# Patient Record
Sex: MALE | Race: WHITE | NOT HISPANIC OR LATINO | Employment: FULL TIME | URBAN - NONMETROPOLITAN AREA
[De-identification: names, ages, dates, MRNs, and addresses within clinical notes are randomized per-mention and may not be internally consistent; named-entity substitution may affect disease eponyms.]

---

## 2023-04-04 ENCOUNTER — OFFICE VISIT (OUTPATIENT)
Dept: PRIMARY CARE | Facility: CLINIC | Age: 33
End: 2023-04-04
Payer: COMMERCIAL

## 2023-04-04 VITALS
RESPIRATION RATE: 14 BRPM | HEART RATE: 98 BPM | SYSTOLIC BLOOD PRESSURE: 121 MMHG | OXYGEN SATURATION: 94 % | WEIGHT: 283.2 LBS | BODY MASS INDEX: 39.5 KG/M2 | DIASTOLIC BLOOD PRESSURE: 74 MMHG | TEMPERATURE: 97.7 F

## 2023-04-04 DIAGNOSIS — J02.9 PHARYNGITIS, UNSPECIFIED ETIOLOGY: Primary | ICD-10-CM

## 2023-04-04 PROBLEM — F41.9 ANXIETY: Status: ACTIVE | Noted: 2023-04-04

## 2023-04-04 PROBLEM — E66.9 OBESITY: Status: ACTIVE | Noted: 2023-04-04

## 2023-04-04 PROBLEM — F32.5 DEPRESSION, MAJOR, IN REMISSION (CMS-HCC): Status: ACTIVE | Noted: 2023-04-04

## 2023-04-04 PROBLEM — G47.33 OSA ON CPAP: Status: ACTIVE | Noted: 2023-04-04

## 2023-04-04 LAB — POC RAPID STREP: POSITIVE

## 2023-04-04 PROCEDURE — 87880 STREP A ASSAY W/OPTIC: CPT | Performed by: FAMILY MEDICINE

## 2023-04-04 PROCEDURE — 99214 OFFICE O/P EST MOD 30 MIN: CPT | Performed by: FAMILY MEDICINE

## 2023-04-04 PROCEDURE — 1036F TOBACCO NON-USER: CPT | Performed by: FAMILY MEDICINE

## 2023-04-04 RX ORDER — SERTRALINE HYDROCHLORIDE 50 MG/1
75 TABLET, FILM COATED ORAL DAILY
COMMUNITY
Start: 2019-04-01 | End: 2023-12-18 | Stop reason: SDUPTHER

## 2023-04-04 RX ORDER — AZITHROMYCIN 250 MG/1
TABLET, FILM COATED ORAL
Qty: 6 TABLET | Refills: 0 | Status: SHIPPED | OUTPATIENT
Start: 2023-04-04 | End: 2023-05-11 | Stop reason: ALTCHOICE

## 2023-04-04 ASSESSMENT — ENCOUNTER SYMPTOMS
TROUBLE SWALLOWING: 0
SHORTNESS OF BREATH: 0
DIARRHEA: 0
STRIDOR: 0
HEADACHES: 0
VOMITING: 0
COUGH: 0
HOARSE VOICE: 0
ABDOMINAL PAIN: 0
SORE THROAT: 1
SWOLLEN GLANDS: 0
NECK PAIN: 0

## 2023-04-04 NOTE — PATIENT INSTRUCTIONS
BMI was above normal measurement. Current weight:    Weight change since last visit (-) denotes wt loss     Weight loss needed to achieve BMI 25:   Lbs  Weight loss needed to achieve BMI 30:   Lbs  Provided instructions on dietary changes  Advised to Increase physical activityBMI was above normal measurement. Current weight:    Weight change since last visit (-) denotes wt loss     Weight loss needed to achieve BMI 25:   Lbs  Weight loss needed to achieve BMI 30:   Lbs  Provided instructions on dietary changes  Advised to Increase physical activity..

## 2023-04-04 NOTE — PROGRESS NOTES
HPI:    Shayan Estes. Is 33 y.o.. male. Here for acute visit-     PCP is - [facility]      What concern/ problem/pain/symptom  brings you here today?    S/t     how long has pt had sxs?    4 days     describe symptoms-    S/t- feels better today, but wife tested positive for strep    fever- no  nausea- no  vomiting- no    how often do symptoms occur?    Constant     has pt tried anything for current symptoms, including medications (OTC or prescription)  ?      no    what makes symptoms worse?    N/a     has pt been seen recently for this problem ( within past 2-3 weeks) ?   No    if yes- where?  N/a     by who?  N/a    what treatment was provided?    N/a       Pt was treated for positive Strep in Feb- amoxicillin       Wife positive for strep today       Rapid strep- done today- positive      /74 (BP Location: Left arm, Patient Position: Sitting, BP Cuff Size: Large adult)   Pulse 98   Temp 36.5 °C (97.7 °F) (Temporal)   Resp 14   Wt 128 kg (283 lb 3.2 oz)   BMI 39.50 kg/m²         PHYSICAL:      Pt is A and O x3, NAD, Vital signs are within normal limits  General Appearance- normal , good hygiene, talks easily  EYES- conjunctiva- normal  lids- normal  EARS/NOSE-TM's normal, head normocephalic and atraumatic, nasopharynx-no nasal discharge, no trismus, no hot potato voice  OROPHARYNX- red with mild exudate  NECK- supple, FROM  LYMPH- mild bilateral scattered cervical lymph nodes palpated - tender  CV- RRR without murmur  EXTREMITIES- no edema or varicosities  PULM- CTA bilaterally  Respiratory effort- normal respiratory effort , no retractions or nasal flaring   ABDOMEN- normoactive BS's  SKIN- no abnormal skin lesions on inspection or palpation   NEURO- no focal deficits  PSYCH- pleasant, normal judgement and insight      The number and complexity of problems addressed is considered moderate.  The amount and/or complexity of data reviewed and analyzed is considered moderate. The risk of complications  and/or morbidity/mortality of patient is considered moderate. Overall, this patient encounter is considered a moderate risk visit.      What are antibiotics?  Antibiotics are medicines that help people fight infections caused by bacteria. They work by killing bacteria that are in the body. These medicines come in many different forms, including pills, ointments, and liquids that are given by injection.    Antibiotics can do a lot of good. For people with serious bacterial infections, antibiotics can save lives. But people use them far too often, even when they're not needed. This is causing a very serious problem called antibiotic resistance. Antibiotic resistance happens when bacteria that have been exposed to an antibiotic change so that the antibiotic can no longer kill them. In those bacteria, the antibiotic has no effect. Because of this problem, doctors are having a harder and harder time treating infections. Experts worry that there will soon be infections that don't respond to any antibiotics.    When are antibiotics helpful?  Antibiotics can help people fight off infections caused by bacteria. They do not work on infections caused by viruses.    Some common bacterial infections that are treated with antibiotics include:    Strep throat    Pneumonia (an infection of the lungs)    Bladder infections    Infections you catch through sex, such as gonorrhea and chlamydia    When are antibiotics NOT helpful?    Antibiotics do not work on infections caused by viruses.    Antibiotics are not helpful for the common cold, because the common cold is caused by a virus.    Antibiotics are not helpful for the flu, because the flu is caused by a virus. (People with the flu can be treated with medicines called antiviral medicines, which are different from antibiotics.)      Even though antibiotics don't work on infections caused by viruses, people sometimes believe that they do. That's because they took antibiotics for a  viral infection before and then got better. The problem is that those people would have gotten better with or without an antibiotic. When they get better with the antibiotic, they think that's what cured them, when in reality the antibiotic had nothing to do with it.    What's the harm in taking antibiotics even if they might not help?  There are many reasons you should not take antibiotics unless you absolutely need them:    Antibiotics cause side effects, such as nausea, vomiting, and diarrhea. They can even make it more likely that you will get a different kind of infection, such as yeast infection (if you are a woman).    Allergies to antibiotics are common. You can develop an allergy to an antibiotic, even if you have not had a problem with it before. Some allergies are just unpleasant, causing rashes and itching. But some can be very serious and even life-threatening. It is better to avoid any risk of an allergy, if the antibiotic wouldn't help you anyway.    Overuse of antibiotics leads to antibiotic resistance. Using antibiotics when they are not needed gives bacteria a chance to change, so that the antibiotics cannot hurt them later on. People who have infections caused by antibiotic-resistant bacteria often have to be treated in the hospital with many different antibiotics. People can even die from these infections, because there is no antibiotic that will cure them.    When should I take antibiotics?  You should take antibiotics only when a doctor or nurse prescribes them to you. You should never take antibiotics prescribed to someone else, and you should not take antibiotics that were prescribed to you for a previous illness. When prescribing an antibiotic, doctors and nurses have to carefully pick the right antibiotic for a particular infection. Not all antibiotics work on all bacteria.    If you do have an infection caused by a bacteria, your doctor or nurse might want to find out what that bacteria  "is, and which antibiotics can kill it. They do this by taking a \"culture\" of the bacteria and growing it in the lab. But it's not possible to do a culture on someone who has already started an antibiotic. So if you start an antibiotic without input from a doctor or nurse it will be impossible to know if you have taken the right one.    What can I do to reduce antibiotic resistance?  Here are some things that can help:    Do not pressure your doctor or nurse for antibiotics when they do not think you need them.    If you are prescribed antibiotics, finish all of the medicine and take it exactly as directed. Never skip doses or stop taking the medicine without talking to your doctor or nurse.    Do not give antibiotics that were prescribed to you to anyone else.    What if my doctor prescribes an antibiotic that did not work for me before?  If an antibiotic did not work for you before, that does not mean it will never work for you. If you have used an antibiotic before and it did not work, tell your doctor. But keep in mind that the infection you had before might not have been caused by the same bacteria that you have now. The \"best\" antibiotic is the right one for the bacteria causing the infection, not for the person with the infection.    What if I am allergic to an antibiotic?  If you had a bad reaction to an antibiotic, tell your doctor or nurse about it. But do not assume you are allergic unless your doctor or nurse tells you that you are.    Many people who think they are allergic to an antibiotic are wrong. If you get nauseous after taking an antibiotic, that does not mean you are allergic to it. Nausea is a common side effect of many antibiotics. If you are a woman and you get a yeast infection after taking an antibiotic, that does not mean you are allergic to it. Yeast infections are a common side effect of antibiotics.    Symptoms of antibiotic allergy can be mild and include a flat rash and itching. They " can also be more serious and include:    Hives - Hives are raised, red patches of skin that are usually very itchy (picture 1).    Lip or tongue swelling    Trouble swallowing or breathing    Serious allergy symptoms can start right after you start taking an antibiotic if you are very sensitive. Less serious symptoms, on the other hand, often start a day or more later.    Almost all antibiotics may cause possible side effects of allergic reaction, nausea, vomiting, diarrhea- most worrisome caused by C. diff, and secondary yeast infection.        ASSESSMENT/PLAN:      Start zithromax         Call if no better or if symptoms worsen

## 2023-04-04 NOTE — PROGRESS NOTES
HPI:    Shayan Estes. Is 33 y.o.. male. Here for acute visit-     PCP is - [facility]      What concern/ problem/pain/symptom  brings you here today?      how long has pt had sxs?      describe symptoms-    fever-  nausea-  vomiting-    how often do symptoms occur?      has pt tried anything for current symptoms, including medications (OTC or prescription)  ?        what makes symptoms worse?      has pt been seen recently for this problem ( within past 2-3 weeks) ?     if yes- where?    by who?    what treatment was provided?    There were no vitals taken for this visit.        PHYSICAL:      Pt is A and O x3, NAD, Vital signs are within normal limits  General Appearance- normal , good hygiene, talks easily  EYES- conjunctiva- normal  lids- normal  EARS/NOSE-TM's normal, head normocephalic and atraumatic, nasopharynx-no nasal discharge, no trismus, no hot potato voice  OROPHARYNX- red with mild exudate  NECK- supple, FROM  LYMPH- mild bilateral scattered cervical lymph nodes palpated - tender  CV- RRR without murmur  EXTREMITIES- no edema or varicosities  PULM- CTA bilaterally  Respiratory effort- normal respiratory effort , no retractions or nasal flaring   ABDOMEN- normoactive BS's  SKIN- no abnormal skin lesions on inspection or palpation   NEURO- no focal deficits  PSYCH- pleasant, normal judgement and insight      The number and complexity of problems addressed is considered moderate.  The amount and/or complexity of data reviewed and analyzed is considered moderate. The risk of complications and/or morbidity/mortality of patient is considered moderate. Overall, this patient encounter is considered a moderate risk visit.      What are antibiotics?  Antibiotics are medicines that help people fight infections caused by bacteria. They work by killing bacteria that are in the body. These medicines come in many different forms, including pills, ointments, and liquids that are given by injection.    Antibiotics can do  a lot of good. For people with serious bacterial infections, antibiotics can save lives. But people use them far too often, even when they're not needed. This is causing a very serious problem called antibiotic resistance. Antibiotic resistance happens when bacteria that have been exposed to an antibiotic change so that the antibiotic can no longer kill them. In those bacteria, the antibiotic has no effect. Because of this problem, doctors are having a harder and harder time treating infections. Experts worry that there will soon be infections that don't respond to any antibiotics.    When are antibiotics helpful?  Antibiotics can help people fight off infections caused by bacteria. They do not work on infections caused by viruses.    Some common bacterial infections that are treated with antibiotics include:    Strep throat    Pneumonia (an infection of the lungs)    Bladder infections    Infections you catch through sex, such as gonorrhea and chlamydia    When are antibiotics NOT helpful?    Antibiotics do not work on infections caused by viruses.    Antibiotics are not helpful for the common cold, because the common cold is caused by a virus.    Antibiotics are not helpful for the flu, because the flu is caused by a virus. (People with the flu can be treated with medicines called antiviral medicines, which are different from antibiotics.)      Even though antibiotics don't work on infections caused by viruses, people sometimes believe that they do. That's because they took antibiotics for a viral infection before and then got better. The problem is that those people would have gotten better with or without an antibiotic. When they get better with the antibiotic, they think that's what cured them, when in reality the antibiotic had nothing to do with it.    What's the harm in taking antibiotics even if they might not help?  There are many reasons you should not take antibiotics unless you absolutely need  "them:    Antibiotics cause side effects, such as nausea, vomiting, and diarrhea. They can even make it more likely that you will get a different kind of infection, such as yeast infection (if you are a woman).    Allergies to antibiotics are common. You can develop an allergy to an antibiotic, even if you have not had a problem with it before. Some allergies are just unpleasant, causing rashes and itching. But some can be very serious and even life-threatening. It is better to avoid any risk of an allergy, if the antibiotic wouldn't help you anyway.    Overuse of antibiotics leads to antibiotic resistance. Using antibiotics when they are not needed gives bacteria a chance to change, so that the antibiotics cannot hurt them later on. People who have infections caused by antibiotic-resistant bacteria often have to be treated in the hospital with many different antibiotics. People can even die from these infections, because there is no antibiotic that will cure them.    When should I take antibiotics?  You should take antibiotics only when a doctor or nurse prescribes them to you. You should never take antibiotics prescribed to someone else, and you should not take antibiotics that were prescribed to you for a previous illness. When prescribing an antibiotic, doctors and nurses have to carefully pick the right antibiotic for a particular infection. Not all antibiotics work on all bacteria.    If you do have an infection caused by a bacteria, your doctor or nurse might want to find out what that bacteria is, and which antibiotics can kill it. They do this by taking a \"culture\" of the bacteria and growing it in the lab. But it's not possible to do a culture on someone who has already started an antibiotic. So if you start an antibiotic without input from a doctor or nurse it will be impossible to know if you have taken the right one.    What can I do to reduce antibiotic resistance?  Here are some things that can " "help:    Do not pressure your doctor or nurse for antibiotics when they do not think you need them.    If you are prescribed antibiotics, finish all of the medicine and take it exactly as directed. Never skip doses or stop taking the medicine without talking to your doctor or nurse.    Do not give antibiotics that were prescribed to you to anyone else.    What if my doctor prescribes an antibiotic that did not work for me before?  If an antibiotic did not work for you before, that does not mean it will never work for you. If you have used an antibiotic before and it did not work, tell your doctor. But keep in mind that the infection you had before might not have been caused by the same bacteria that you have now. The \"best\" antibiotic is the right one for the bacteria causing the infection, not for the person with the infection.    What if I am allergic to an antibiotic?  If you had a bad reaction to an antibiotic, tell your doctor or nurse about it. But do not assume you are allergic unless your doctor or nurse tells you that you are.    Many people who think they are allergic to an antibiotic are wrong. If you get nauseous after taking an antibiotic, that does not mean you are allergic to it. Nausea is a common side effect of many antibiotics. If you are a woman and you get a yeast infection after taking an antibiotic, that does not mean you are allergic to it. Yeast infections are a common side effect of antibiotics.    Symptoms of antibiotic allergy can be mild and include a flat rash and itching. They can also be more serious and include:    Hives - Hives are raised, red patches of skin that are usually very itchy (picture 1).    Lip or tongue swelling    Trouble swallowing or breathing    Serious allergy symptoms can start right after you start taking an antibiotic if you are very sensitive. Less serious symptoms, on the other hand, often start a day or more later.    Almost all antibiotics may cause possible " side effects of allergic reaction, nausea, vomiting, diarrhea- most worrisome caused by C. diff, and secondary yeast infection.        ASSESSMENT/PLAN:      Start         Call if no better or if symptoms worsen

## 2023-05-10 PROBLEM — E55.9 VITAMIN D DEFICIENCY: Status: ACTIVE | Noted: 2023-05-10

## 2023-05-10 PROBLEM — R74.01 ELEVATED ALT MEASUREMENT: Status: ACTIVE | Noted: 2023-05-10

## 2023-05-10 NOTE — PROGRESS NOTES
Subjective   Patient ID: Shayan Estes is a 33 y.o. male who presents for Rash (Red spots on knees and legs few months /Mostly on knees /Itch once in a while ).    HPI     White dry spots on knees   Some red spots as well  Mostly just on legs  Started last year but has worsened in past few months  Itch at times   He has tried hydrocortisone cream   Denies skin issues - no known psoriasis or eczema   No new changes at home   No med changes     Review of Systems   Constitutional:  Negative for chills, fatigue and fever.   HENT:  Negative for congestion, ear pain and sore throat.    Eyes:  Negative for visual disturbance.   Respiratory:  Negative for cough and shortness of breath.    Cardiovascular:  Negative for chest pain, palpitations and leg swelling.   Gastrointestinal:  Negative for abdominal pain, constipation, diarrhea, nausea and vomiting.   Genitourinary: Negative.    Musculoskeletal: Negative.    Skin:  Positive for rash.   Neurological:  Negative for dizziness, weakness, numbness and headaches.   Psychiatric/Behavioral: Negative.         Objective   /79 (BP Location: Right arm, Patient Position: Sitting, BP Cuff Size: Large adult)   Pulse 89   Temp 36.8 °C (98.3 °F) (Temporal)   Resp 16   Wt 126 kg (278 lb 11.2 oz)   SpO2 96%   BMI 38.87 kg/m²     Physical Exam    Constitutional: Well developed, well nourished, alert and in no acute distress   Eyes: Normal external exam.   Skin: Warm, well perfused, normal skin turgor and color. There are 2 large red plaques with superficial white scale noted on his bilateral knees with several smaller scattered erythematous macules on upper thighs and lower legs.    Neurologic: Cranial nerves II-XII grossly intact.   Psychiatric: Mood calm and affect normal.      Assessment/Plan   Problem List Items Addressed This Visit          Immune    Psoriasis - Primary     Concern for psoriasis- refer dermatology for confirmation   Start topical steroids twice daily  for 2 weeks  Advised him not to use on face, underarms, or groin          Relevant Medications    clobetasol (Temovate) 0.05 % cream       Molly Barfield,   5/11/2023

## 2023-05-11 ENCOUNTER — OFFICE VISIT (OUTPATIENT)
Dept: PRIMARY CARE | Facility: CLINIC | Age: 33
End: 2023-05-11
Payer: COMMERCIAL

## 2023-05-11 VITALS
BODY MASS INDEX: 38.87 KG/M2 | WEIGHT: 278.7 LBS | TEMPERATURE: 98.3 F | RESPIRATION RATE: 16 BRPM | HEART RATE: 89 BPM | OXYGEN SATURATION: 96 % | DIASTOLIC BLOOD PRESSURE: 79 MMHG | SYSTOLIC BLOOD PRESSURE: 123 MMHG

## 2023-05-11 DIAGNOSIS — L40.9 PSORIASIS: Primary | ICD-10-CM

## 2023-05-11 PROBLEM — E66.812 CLASS 2 OBESITY WITHOUT SERIOUS COMORBIDITY WITH BODY MASS INDEX (BMI) OF 38.0 TO 38.9 IN ADULT: Status: ACTIVE | Noted: 2023-04-04

## 2023-05-11 PROCEDURE — 1036F TOBACCO NON-USER: CPT | Performed by: FAMILY MEDICINE

## 2023-05-11 PROCEDURE — 99213 OFFICE O/P EST LOW 20 MIN: CPT | Performed by: FAMILY MEDICINE

## 2023-05-11 RX ORDER — CLOBETASOL PROPIONATE 0.5 MG/G
CREAM TOPICAL 2 TIMES DAILY
Qty: 60 G | Refills: 0 | Status: SHIPPED | OUTPATIENT
Start: 2023-05-11

## 2023-05-11 ASSESSMENT — PATIENT HEALTH QUESTIONNAIRE - PHQ9
1. LITTLE INTEREST OR PLEASURE IN DOING THINGS: NOT AT ALL
SUM OF ALL RESPONSES TO PHQ9 QUESTIONS 1 AND 2: 0
2. FEELING DOWN, DEPRESSED OR HOPELESS: NOT AT ALL

## 2023-05-11 ASSESSMENT — ENCOUNTER SYMPTOMS
HEADACHES: 0
MUSCULOSKELETAL NEGATIVE: 1
NUMBNESS: 0
NAUSEA: 0
VOMITING: 0
CONSTIPATION: 0
PSYCHIATRIC NEGATIVE: 1
CHILLS: 0
SHORTNESS OF BREATH: 0
COUGH: 0
WEAKNESS: 0
FEVER: 0
PALPITATIONS: 0
DIZZINESS: 0
FATIGUE: 0
ABDOMINAL PAIN: 0
DIARRHEA: 0
SORE THROAT: 0

## 2023-05-11 NOTE — ASSESSMENT & PLAN NOTE
Concern for psoriasis- refer dermatology for confirmation   Start topical steroids twice daily for 2 weeks  Advised him not to use on face, underarms, or groin

## 2023-10-05 ENCOUNTER — TELEPHONE (OUTPATIENT)
Dept: PRIMARY CARE | Facility: CLINIC | Age: 33
End: 2023-10-05
Payer: COMMERCIAL

## 2023-10-06 NOTE — PROGRESS NOTES
Subjective        Shayan Estes. Is 33 y.o.. male. Here for follow up office visit-       Chief Complaint   Patient presents with    Suture / Staple Removal     Left hand base of thumb    Immunizations     Influenza vaccine accepted.  Screening completed.     Dr Barfield pt     Dr Lico DO  is unavailable to see pt for follow up today so I am seeing the patient.    HPI:      How is patient doing today?  No pain, no redness or drainage.      Follow up for ED/UC/Hospital admission:  Date(s):  10/2/23      Dx:  Laceration left hand/thumb- 2 cm palmar aspect left thumb   Elevated BP- 175/109; repeat 131/70    ED course:  33-year-old male presents for wound evaluation/hand laceration, patient was cleaning his 3D printer when he cut himself with a metal scraper, he is not up-to-date on tetanus, he denies any other complaints       3 sutures were placed       Right hand dominant       Pt had Tdap at ED     Vision screen done today      Patient Active Problem List    Diagnosis Date Noted    Psoriasis 05/11/2023    Vitamin D deficiency 05/10/2023    Elevated ALT measurement 05/10/2023    Anxiety 04/04/2023    Depression, major, in remission (CMS/HCC) 04/04/2023    Class 2 obesity without serious comorbidity with body mass index (BMI) of 38.0 to 38.9 in adult 04/04/2023    TIFFANIE on CPAP 04/04/2023               Patient Care Team:  Molly Barfield DO as PCP - General  Bhakti Nieves MD as PCP - Lino OLIVER PCP    REVIEW OF SYSTEMS:        Objective      Vitals:    10/09/23 0705   BP: 108/70   BP Location: Left arm   Patient Position: Sitting   BP Cuff Size: Large adult   Pulse: 78   Temp: 36.5 °C (97.7 °F)   TempSrc: Temporal   SpO2: 93%   Weight: 131 kg (289 lb 9.6 oz)       PHYSICAL:      Patient is alert and oriented x 3 , NAD  SKIN- left thumb- laceration/wound healing nicely- no signs infection- sutures easily removed , steri- strips applied   NEURO- no focal deficits  PSYCH- pleasant, normal judgement  and insight      BP Readings from Last 3 Encounters:   10/09/23 108/70   05/11/23 123/79   04/25/23 166/89     BP today looks good- not dizzy or lightheaded     Wt Readings from Last 3 Encounters:   10/09/23 131 kg (289 lb 9.6 oz)   05/11/23 126 kg (278 lb 11.2 oz)   04/25/23 127 kg (280 lb)       BMI Readings from Last 3 Encounters:   10/09/23 40.39 kg/m²   05/11/23 38.87 kg/m²   04/25/23 39.05 kg/m²           The number and complexity of problems addressed is considered moderate.  The amount and/or complexity of data reviewed and analyzed is considered moderate. The risk of complications and/or morbidity/mortality of patient is considered moderate. Overall, this patient encounter is considered a moderate risk visit.    The patient is here for a hospital follow up.  Hospital records were reviewed prior to visit, including relevant labs, imaging findings, consultant notes, and discharge summary.  Medications were reconciled and are current, reviewed today.        Flu shot today     Assessment/Plan      Problem List Items Addressed This Visit    None  Visit Diagnoses       Hospital discharge follow-up    -  Primary    Encounter for wound care        Elevated BP without diagnosis of hypertension                    Keep wound clean and dry       Go to ED-if symptoms worsen or if no better- if increased redness, pain, or swelling; area appears more swollen or draining pus; fever; increased feelings of malaise or fatigue               Current Outpatient Medications:     clobetasol (Temovate) 0.05 % cream, Apply topically 2 times a day. Use for 2 weeks then stop., Disp: 60 g, Rfl: 0    sertraline (Zoloft) 50 mg tablet, Take 1 tablet (50 mg) by mouth once daily., Disp: , Rfl:           Follow up Dr Barfield recheck BP

## 2023-10-09 ENCOUNTER — OFFICE VISIT (OUTPATIENT)
Dept: PRIMARY CARE | Facility: CLINIC | Age: 33
End: 2023-10-09
Payer: COMMERCIAL

## 2023-10-09 VITALS
SYSTOLIC BLOOD PRESSURE: 108 MMHG | HEART RATE: 78 BPM | BODY MASS INDEX: 40.39 KG/M2 | WEIGHT: 289.6 LBS | TEMPERATURE: 97.7 F | OXYGEN SATURATION: 93 % | DIASTOLIC BLOOD PRESSURE: 70 MMHG

## 2023-10-09 DIAGNOSIS — Z51.89 ENCOUNTER FOR WOUND CARE: ICD-10-CM

## 2023-10-09 DIAGNOSIS — R03.0 ELEVATED BP WITHOUT DIAGNOSIS OF HYPERTENSION: ICD-10-CM

## 2023-10-09 DIAGNOSIS — Z09 HOSPITAL DISCHARGE FOLLOW-UP: Primary | ICD-10-CM

## 2023-10-09 PROCEDURE — 90471 IMMUNIZATION ADMIN: CPT | Performed by: FAMILY MEDICINE

## 2023-10-09 PROCEDURE — 1036F TOBACCO NON-USER: CPT | Performed by: FAMILY MEDICINE

## 2023-10-09 PROCEDURE — 90686 IIV4 VACC NO PRSV 0.5 ML IM: CPT | Performed by: FAMILY MEDICINE

## 2023-10-09 PROCEDURE — 99214 OFFICE O/P EST MOD 30 MIN: CPT | Performed by: FAMILY MEDICINE

## 2023-10-09 ASSESSMENT — PAIN SCALES - GENERAL: PAINLEVEL: 0-NO PAIN

## 2023-12-16 NOTE — PROGRESS NOTES
Subjective   Patient ID: Shayan Estes is a 33 y.o. male who presents for Annual Exam, Follow-up, Anxiety, and Depression.    HPI     The patient presents for his annual wellness exam.   Hx of colonoscopy: never   Immunizations: utd   History of depression or anxiety: yes- on zoloft- working well - denies concerns   Diet: Follows an ok diet, knows he overeats , late night snacking  Exercise: Not getting regular exercise but now using standing desk with treadmill under it for past week     Severe TIFFANIE- Started autopap beginning of May 2021 - tolerating well and benefitting from therapy   Using full face mask ; no concerns   Thinks is using 6+ hours nightly   Feeling better, less tired during day     Elevated ALT  No prior eval   Rare alcohol     Current Outpatient Medications   Medication Sig Dispense Refill    clobetasol (Temovate) 0.05 % cream Apply topically 2 times a day. Use for 2 weeks then stop. 60 g 0    sertraline (Zoloft) 50 mg tablet Take 1.5 tablets (75 mg) by mouth once daily. 135 tablet 3     No current facility-administered medications for this visit.       Review of Systems   Constitutional:  Negative for chills, fatigue and fever.   HENT:  Negative for congestion, ear pain and sore throat.    Eyes:  Negative for visual disturbance.   Respiratory:  Negative for cough and shortness of breath.    Cardiovascular:  Negative for chest pain, palpitations and leg swelling.   Gastrointestinal:  Negative for abdominal pain, constipation, diarrhea, nausea and vomiting.   Genitourinary: Negative.    Musculoskeletal: Negative.    Skin:  Negative for rash.   Neurological:  Negative for dizziness, weakness, numbness and headaches.   Psychiatric/Behavioral: Negative.           Scales reviewed    SHAGUFTA-7 Total Score: 1 (12/18/23 1028)  Patient Health Questionnaire-9 Score: 4 (12/18/23 1027)  Patient Health Questionnaire-2 Score: 0 (12/18/23 1027)       Objective   /79 (BP Location: Right arm, Patient  "Position: Sitting, BP Cuff Size: Large adult)   Pulse 80   Temp 36.6 °C (97.9 °F) (Temporal)   Resp 16   Ht 1.803 m (5' 11\")   Wt 133 kg (292 lb 14.4 oz)   SpO2 95%   BMI 40.85 kg/m²     Physical Exam    Constitutional: Well developed, well nourished, alert and in no acute distress.  Head and Face: Normocephalic, atraumatic.  Eyes: Normal external exam. Pupils equally round and reactive to light with normal accommodation and extraocular movements intact.   ENT: External inspection of ears normal, tympanic membranes visualized and normal. Nasal mucosa, septum, and turbinates normal. Oral mucosa moist, oropharynx clear.   Neck: Supple, no lymphadenopathy or masses. Thyroid not enlarged, no palpable nodules.   Cardiovascular: Regular rate and rhythm, normal S1 and S2, no murmurs, gallops, or rubs. Radial pulses normal. No peripheral edema. No carotid bruits.   Pulmonary: No respiratory distress, lungs clear to auscultation bilaterally. No wheezes, rhonchi, rales.  Abdomen: Soft, nontender, nondistended, normal bowel sounds. No masses palpated.   Musculoskeletal: Gait normal. Muscle strength/tone normal. Normal range of motion of all extremities.   Skin: Warm, well perfused, normal skin turgor and color, no lesions or rashes noted.   Neurologic: Cranial nerves II-XII grossly intact. Sensation normal bilaterally.   Psychiatric: Mood calm and affect normal.      Assessment/Plan   Problem List Items Addressed This Visit             ICD-10-CM    Anxiety F41.9    Depression, major, in remission (CMS/formerly [facility]) F32.5     Well controlled, continue zoloft          Relevant Medications    sertraline (Zoloft) 50 mg tablet    Class 3 severe obesity without serious comorbidity with body mass index (BMI) of 40.0 to 44.9 in adult (CMS/formerly [facility]) E66.01, Z68.41     Need for weight loss discussed  He declines nutrition referral at this time            TIFFANIE on CPAP G47.33     Continue APAP nightly          Vitamin D deficiency E55.9    " Relevant Orders    Vitamin D 25-Hydroxy,Total (for eval of Vitamin D levels)    Elevated ALT measurement R74.01     Other Visit Diagnoses         Codes    Annual physical exam    -  Primary Z00.00    Relevant Orders    Comprehensive Metabolic Panel    CBC and Auto Differential    Hemoglobin A1C    Lipid Panel    TSH with reflex to Free T4 if abnormal            Molly Barfield, DO  12/18/2023

## 2023-12-18 ENCOUNTER — LAB (OUTPATIENT)
Dept: LAB | Facility: LAB | Age: 33
End: 2023-12-18
Payer: COMMERCIAL

## 2023-12-18 ENCOUNTER — OFFICE VISIT (OUTPATIENT)
Dept: PRIMARY CARE | Facility: CLINIC | Age: 33
End: 2023-12-18
Payer: COMMERCIAL

## 2023-12-18 VITALS
DIASTOLIC BLOOD PRESSURE: 79 MMHG | SYSTOLIC BLOOD PRESSURE: 120 MMHG | WEIGHT: 292.9 LBS | OXYGEN SATURATION: 95 % | HEIGHT: 71 IN | HEART RATE: 80 BPM | RESPIRATION RATE: 16 BRPM | TEMPERATURE: 97.9 F | BODY MASS INDEX: 41.01 KG/M2

## 2023-12-18 DIAGNOSIS — Z00.00 ANNUAL PHYSICAL EXAM: ICD-10-CM

## 2023-12-18 DIAGNOSIS — E55.9 VITAMIN D DEFICIENCY: ICD-10-CM

## 2023-12-18 DIAGNOSIS — G47.33 OSA ON CPAP: ICD-10-CM

## 2023-12-18 DIAGNOSIS — Z00.00 ANNUAL PHYSICAL EXAM: Primary | ICD-10-CM

## 2023-12-18 DIAGNOSIS — R74.01 ELEVATED ALT MEASUREMENT: ICD-10-CM

## 2023-12-18 DIAGNOSIS — F41.9 ANXIETY: ICD-10-CM

## 2023-12-18 DIAGNOSIS — E66.01 CLASS 3 SEVERE OBESITY WITHOUT SERIOUS COMORBIDITY WITH BODY MASS INDEX (BMI) OF 40.0 TO 44.9 IN ADULT, UNSPECIFIED OBESITY TYPE (MULTI): ICD-10-CM

## 2023-12-18 DIAGNOSIS — F32.5 DEPRESSION, MAJOR, IN REMISSION (CMS-HCC): ICD-10-CM

## 2023-12-18 PROBLEM — E66.813 CLASS 3 SEVERE OBESITY WITHOUT SERIOUS COMORBIDITY WITH BODY MASS INDEX (BMI) OF 40.0 TO 44.9 IN ADULT: Status: ACTIVE | Noted: 2023-04-04

## 2023-12-18 PROCEDURE — 80061 LIPID PANEL: CPT

## 2023-12-18 PROCEDURE — 85025 COMPLETE CBC W/AUTO DIFF WBC: CPT

## 2023-12-18 PROCEDURE — 83036 HEMOGLOBIN GLYCOSYLATED A1C: CPT

## 2023-12-18 PROCEDURE — 99395 PREV VISIT EST AGE 18-39: CPT | Performed by: FAMILY MEDICINE

## 2023-12-18 PROCEDURE — 84443 ASSAY THYROID STIM HORMONE: CPT

## 2023-12-18 PROCEDURE — 3008F BODY MASS INDEX DOCD: CPT | Performed by: FAMILY MEDICINE

## 2023-12-18 PROCEDURE — 1036F TOBACCO NON-USER: CPT | Performed by: FAMILY MEDICINE

## 2023-12-18 PROCEDURE — 80053 COMPREHEN METABOLIC PANEL: CPT

## 2023-12-18 PROCEDURE — 82306 VITAMIN D 25 HYDROXY: CPT

## 2023-12-18 PROCEDURE — 36415 COLL VENOUS BLD VENIPUNCTURE: CPT

## 2023-12-18 RX ORDER — SERTRALINE HYDROCHLORIDE 50 MG/1
75 TABLET, FILM COATED ORAL DAILY
Qty: 135 TABLET | Refills: 3 | Status: SHIPPED | OUTPATIENT
Start: 2023-12-18 | End: 2024-12-17

## 2023-12-18 ASSESSMENT — ANXIETY QUESTIONNAIRES
3. WORRYING TOO MUCH ABOUT DIFFERENT THINGS: NOT AT ALL
4. TROUBLE RELAXING: NOT AT ALL
6. BECOMING EASILY ANNOYED OR IRRITABLE: NOT AT ALL
1. FEELING NERVOUS, ANXIOUS, OR ON EDGE: SEVERAL DAYS
IF YOU CHECKED OFF ANY PROBLEMS ON THIS QUESTIONNAIRE, HOW DIFFICULT HAVE THESE PROBLEMS MADE IT FOR YOU TO DO YOUR WORK, TAKE CARE OF THINGS AT HOME, OR GET ALONG WITH OTHER PEOPLE: NOT DIFFICULT AT ALL
7. FEELING AFRAID AS IF SOMETHING AWFUL MIGHT HAPPEN: NOT AT ALL
GAD7 TOTAL SCORE: 1
2. NOT BEING ABLE TO STOP OR CONTROL WORRYING: NOT AT ALL
5. BEING SO RESTLESS THAT IT IS HARD TO SIT STILL: NOT AT ALL

## 2023-12-18 ASSESSMENT — PROMIS GLOBAL HEALTH SCALE
RATE_GENERAL_HEALTH: GOOD
RATE_PHYSICAL_HEALTH: GOOD
EMOTIONAL_PROBLEMS: SOMETIMES
CARRYOUT_SOCIAL_ACTIVITIES: VERY GOOD
RATE_SOCIAL_SATISFACTION: GOOD
CARRYOUT_PHYSICAL_ACTIVITIES: COMPLETELY
RATE_AVERAGE_PAIN: 0
RATE_QUALITY_OF_LIFE: VERY GOOD
RATE_MENTAL_HEALTH: GOOD
RATE_AVERAGE_FATIGUE: MODERATE

## 2023-12-18 ASSESSMENT — PATIENT HEALTH QUESTIONNAIRE - PHQ9
7. TROUBLE CONCENTRATING ON THINGS, SUCH AS READING THE NEWSPAPER OR WATCHING TELEVISION: NOT AT ALL
SUM OF ALL RESPONSES TO PHQ QUESTIONS 1-9: 4
5. POOR APPETITE OR OVEREATING: MORE THAN HALF THE DAYS
1. LITTLE INTEREST OR PLEASURE IN DOING THINGS: NOT AT ALL
SUM OF ALL RESPONSES TO PHQ9 QUESTIONS 1 AND 2: 0
9. THOUGHTS THAT YOU WOULD BE BETTER OFF DEAD, OR OF HURTING YOURSELF: NOT AT ALL
3. TROUBLE FALLING OR STAYING ASLEEP OR SLEEPING TOO MUCH: SEVERAL DAYS
10. IF YOU CHECKED OFF ANY PROBLEMS, HOW DIFFICULT HAVE THESE PROBLEMS MADE IT FOR YOU TO DO YOUR WORK, TAKE CARE OF THINGS AT HOME, OR GET ALONG WITH OTHER PEOPLE: NOT DIFFICULT AT ALL
8. MOVING OR SPEAKING SO SLOWLY THAT OTHER PEOPLE COULD HAVE NOTICED. OR THE OPPOSITE, BEING SO FIGETY OR RESTLESS THAT YOU HAVE BEEN MOVING AROUND A LOT MORE THAN USUAL: NOT AT ALL
6. FEELING BAD ABOUT YOURSELF - OR THAT YOU ARE A FAILURE OR HAVE LET YOURSELF OR YOUR FAMILY DOWN: NOT AT ALL
4. FEELING TIRED OR HAVING LITTLE ENERGY: SEVERAL DAYS
2. FEELING DOWN, DEPRESSED OR HOPELESS: NOT AT ALL

## 2023-12-18 ASSESSMENT — ENCOUNTER SYMPTOMS
VOMITING: 0
DIARRHEA: 0
DIZZINESS: 0
NUMBNESS: 0
PALPITATIONS: 0
HEADACHES: 0
WEAKNESS: 0
FEVER: 0
COUGH: 0
NAUSEA: 0
FATIGUE: 0
PSYCHIATRIC NEGATIVE: 1
MUSCULOSKELETAL NEGATIVE: 1
SHORTNESS OF BREATH: 0
CONSTIPATION: 0
ABDOMINAL PAIN: 0
CHILLS: 0
SORE THROAT: 0

## 2023-12-18 NOTE — PATIENT INSTRUCTIONS
Recommendations for men annual wellness exam:   Colonoscopy at age 45 or earlier if positive family history of colon cancer   Discuss prostate cancer screening between ages 55-69 or sooner if family history of prostate cancer   One time screen for abdominal aortic aneurysm for men ages 65-75 who have ever smoked  Screening for lung cancer with low-dose CT in all adults age 50-80 who have a 20 pack-year smoking history and currently smoke or have quit within the past 15 years  Follow a healthy diet (Dash diet, Mediterranean diet)  Exercise 150 min/wk  Maintain healthy weight (BMI < 25)  Do not smoke   Alcohol in moderation (up to 2 drinks/day)   Get enough sleep (7-8 hours/night)  Make sure immunizations are up to date (influenza, pneumococcal, Tdap, shingles if age > 50)  Visit dentist twice yearly    Your BMI is in the overweight or obese category.  Normal BMI- 18.5-25  Overweight=  BMI 26-29  Obese= BMI 30-39  Morbidly Obese = BMI >40  Discussed diet and exercise with patient.  Need to increase activity on a daily basis especially walking.  Monitor total calories per day- decrease carbohydrates and fats. Goal - lose 1-2 pounds per week - best way to do this is to reduce daily caloric intake by 500 calories per day. Recommend 150 minutes of moderate-intensity exercise as tolerated per week and 2-3 days of resistance, flexibility, and neuromotor exercises per week.  Obesity increases the risk of high blood pressure and high cholesterol (which are risk factors for cardiovascular disease), risk of developing diabetes mellitus, breathing problems, joint pain, psychological stress, low self esteem, and impaired social, physical, and emotional functioning. Recommendations include following a healthy diet, limiting portion size, avoiding extra sweets, sugary beverages, or extra calories, and eating lots of fresh fruits, vegetables, whole grains, low fat dairy, lean meats (chicken, fish), and getting 30-60 minutes of  exercise daily. The dash and mediterranean diets are good to follow.

## 2023-12-19 LAB
25(OH)D3 SERPL-MCNC: 19 NG/ML (ref 30–100)
ALBUMIN SERPL BCP-MCNC: 4.7 G/DL (ref 3.4–5)
ALP SERPL-CCNC: 68 U/L (ref 33–120)
ALT SERPL W P-5'-P-CCNC: 50 U/L (ref 10–52)
ANION GAP SERPL CALC-SCNC: 12 MMOL/L (ref 10–20)
AST SERPL W P-5'-P-CCNC: 26 U/L (ref 9–39)
BASOPHILS # BLD AUTO: 0.04 X10*3/UL (ref 0–0.1)
BASOPHILS NFR BLD AUTO: 0.5 %
BILIRUB SERPL-MCNC: 0.7 MG/DL (ref 0–1.2)
BUN SERPL-MCNC: 17 MG/DL (ref 6–23)
CALCIUM SERPL-MCNC: 9.3 MG/DL (ref 8.6–10.6)
CHLORIDE SERPL-SCNC: 106 MMOL/L (ref 98–107)
CHOLEST SERPL-MCNC: 170 MG/DL (ref 0–199)
CHOLESTEROL/HDL RATIO: 3.1
CO2 SERPL-SCNC: 26 MMOL/L (ref 21–32)
CREAT SERPL-MCNC: 0.97 MG/DL (ref 0.5–1.3)
EOSINOPHIL # BLD AUTO: 0.3 X10*3/UL (ref 0–0.7)
EOSINOPHIL NFR BLD AUTO: 3.7 %
ERYTHROCYTE [DISTWIDTH] IN BLOOD BY AUTOMATED COUNT: 11.7 % (ref 11.5–14.5)
EST. AVERAGE GLUCOSE BLD GHB EST-MCNC: 105 MG/DL
GFR SERPL CREATININE-BSD FRML MDRD: >90 ML/MIN/1.73M*2
GLUCOSE SERPL-MCNC: 110 MG/DL (ref 74–99)
HBA1C MFR BLD: 5.3 %
HCT VFR BLD AUTO: 44.5 % (ref 41–52)
HDLC SERPL-MCNC: 55 MG/DL
HGB BLD-MCNC: 14.8 G/DL (ref 13.5–17.5)
IMM GRANULOCYTES # BLD AUTO: 0.02 X10*3/UL (ref 0–0.7)
IMM GRANULOCYTES NFR BLD AUTO: 0.2 % (ref 0–0.9)
LDLC SERPL CALC-MCNC: 74 MG/DL
LYMPHOCYTES # BLD AUTO: 2.13 X10*3/UL (ref 1.2–4.8)
LYMPHOCYTES NFR BLD AUTO: 26.4 %
MCH RBC QN AUTO: 28.6 PG (ref 26–34)
MCHC RBC AUTO-ENTMCNC: 33.3 G/DL (ref 32–36)
MCV RBC AUTO: 86 FL (ref 80–100)
MONOCYTES # BLD AUTO: 0.66 X10*3/UL (ref 0.1–1)
MONOCYTES NFR BLD AUTO: 8.2 %
NEUTROPHILS # BLD AUTO: 4.92 X10*3/UL (ref 1.2–7.7)
NEUTROPHILS NFR BLD AUTO: 61 %
NON HDL CHOLESTEROL: 115 MG/DL (ref 0–149)
NRBC BLD-RTO: 0 /100 WBCS (ref 0–0)
PLATELET # BLD AUTO: 210 X10*3/UL (ref 150–450)
POTASSIUM SERPL-SCNC: 4.4 MMOL/L (ref 3.5–5.3)
PROT SERPL-MCNC: 7.4 G/DL (ref 6.4–8.2)
RBC # BLD AUTO: 5.17 X10*6/UL (ref 4.5–5.9)
SODIUM SERPL-SCNC: 140 MMOL/L (ref 136–145)
TRIGL SERPL-MCNC: 203 MG/DL (ref 0–149)
TSH SERPL-ACNC: 1.92 MIU/L (ref 0.44–3.98)
VLDL: 41 MG/DL (ref 0–40)
WBC # BLD AUTO: 8.1 X10*3/UL (ref 4.4–11.3)

## 2023-12-20 DIAGNOSIS — R74.01 ELEVATED ALT MEASUREMENT: Primary | ICD-10-CM

## 2023-12-20 DIAGNOSIS — E55.9 VITAMIN D DEFICIENCY: ICD-10-CM

## 2023-12-20 RX ORDER — ERGOCALCIFEROL 1.25 MG/1
50000 CAPSULE ORAL
Qty: 12 CAPSULE | Refills: 0 | Status: SHIPPED | OUTPATIENT
Start: 2023-12-20 | End: 2024-03-13

## 2024-01-04 ENCOUNTER — APPOINTMENT (OUTPATIENT)
Dept: RADIOLOGY | Facility: CLINIC | Age: 34
End: 2024-01-04
Payer: COMMERCIAL

## 2024-01-09 ENCOUNTER — APPOINTMENT (OUTPATIENT)
Dept: RADIOLOGY | Facility: CLINIC | Age: 34
End: 2024-01-09
Payer: COMMERCIAL

## 2024-01-15 ENCOUNTER — APPOINTMENT (OUTPATIENT)
Dept: RADIOLOGY | Facility: CLINIC | Age: 34
End: 2024-01-15
Payer: COMMERCIAL

## 2024-01-22 ENCOUNTER — APPOINTMENT (OUTPATIENT)
Dept: RADIOLOGY | Facility: CLINIC | Age: 34
End: 2024-01-22
Payer: COMMERCIAL

## 2024-12-17 NOTE — PROGRESS NOTES
Subjective   Patient ID: Shayan Estes is a 34 y.o. male who presents for Annual Exam, Follow-up, and Immunizations.    HPI     The patient presents for his annual wellness exam.   Hx of colonoscopy: never   Immunizations: utd   History of depression or anxiety: yes- on zoloft- working well - denies concerns   Diet: Follows an ok diet, knows he overeats , late night snacking  Exercise: Not getting regular exercise     Severe TIFFANIE- on APAP since 2021 - tolerating well and benefitting from therapy   Using full face mask ; no concerns   Thinks is using 6+ hours nightly   Feeling better, less tired during day     Vit D def- not taking currently      Elevated ALT- U/S ordered last year- not done   Rare alcohol     No acute concerns     Current Outpatient Medications   Medication Sig Dispense Refill    sertraline (Zoloft) 50 mg tablet Take 1.5 tablets (75 mg) by mouth once daily. 135 tablet 3     No current facility-administered medications for this visit.       Review of Systems   Constitutional:  Negative for chills, fatigue and fever.   HENT:  Negative for congestion, ear pain and sore throat.    Eyes:  Negative for visual disturbance.   Respiratory:  Negative for cough and shortness of breath.    Cardiovascular:  Negative for chest pain, palpitations and leg swelling.   Gastrointestinal:  Negative for abdominal pain, constipation, diarrhea, nausea and vomiting.   Genitourinary: Negative.    Musculoskeletal: Negative.    Skin:  Negative for rash.   Neurological:  Negative for dizziness, weakness, numbness and headaches.   Psychiatric/Behavioral: Negative.           Scales reviewed    SHAGUFTA-7 Total Score: 4 (12/18/24 1034)  Patient Health Questionnaire-9 Score: 4 (12/18/24 1034)  Patient Health Questionnaire-2 Score: 1 (12/18/24 1034)       Objective   /78 (BP Location: Right arm, Patient Position: Sitting, BP Cuff Size: Large adult)   Pulse 90   Temp 36.5 °C (97.7 °F) (Temporal)   Resp 16   Ht 1.803 m (5'  "11\")   Wt 130 kg (286 lb 12.8 oz)   SpO2 98%   BMI 40.00 kg/m²     Physical Exam    Constitutional: Well developed, well nourished, alert and in no acute distress.  Head and Face: Normocephalic, atraumatic.  Eyes: Normal external exam. Pupils equally round and reactive to light with normal accommodation and extraocular movements intact.   ENT: External inspection of ears normal, tympanic membranes visualized and normal. Nasal mucosa, septum, and turbinates normal. Oral mucosa moist, oropharynx clear.   Neck: Supple, no lymphadenopathy or masses. Thyroid not enlarged, no palpable nodules.   Cardiovascular: Regular rate and rhythm, normal S1 and S2, no murmurs, gallops, or rubs. Radial pulses normal. No peripheral edema. No carotid bruits.   Pulmonary: No respiratory distress, lungs clear to auscultation bilaterally. No wheezes, rhonchi, rales.  Abdomen: Soft, nontender, nondistended, normal bowel sounds. No masses palpated.   Musculoskeletal: Gait normal. Muscle strength/tone normal. Normal range of motion of all extremities.   Skin: Warm, well perfused, normal skin turgor and color, no lesions or rashes noted.   Neurologic: Cranial nerves II-XII grossly intact. Sensation normal bilaterally.   Psychiatric: Mood calm and affect normal.      Assessment/Plan     Problem List Items Addressed This Visit       Anxiety    Depression, major, in remission (CMS-HCC)    Current Assessment & Plan     Doing well on zoloft- continue          Relevant Medications    sertraline (Zoloft) 50 mg tablet    Class 3 severe obesity without serious comorbidity with body mass index (BMI) of 40.0 to 44.9 in adult    TIFFANIE on CPAP - Primary    Overview     severe TIFFANIE 3/27/21         Current Assessment & Plan     Continue APAP nightly          Vitamin D deficiency    Relevant Orders    Vitamin D 25-Hydroxy,Total (for eval of Vitamin D levels)    Elevated ALT measurement    Current Assessment & Plan     Weight loss advised  Will get liver U/S " if remains elevated           Other Visit Diagnoses       Annual physical exam        Relevant Orders    CBC and Auto Differential    Comprehensive Metabolic Panel    Hemoglobin A1C    Lipid Panel    Immunization due        Relevant Orders    Flu vaccine, trivalent, preservative free, age 6 months and greater (Fluarix/Fluzone/Flulaval) (Completed)    Need for hepatitis C screening test        Relevant Orders    Hepatitis C Antibody            Follow up in 12 months.      Molly Barfield, DO  12/18/2024

## 2024-12-18 ENCOUNTER — APPOINTMENT (OUTPATIENT)
Dept: PRIMARY CARE | Facility: CLINIC | Age: 34
End: 2024-12-18
Payer: COMMERCIAL

## 2024-12-18 VITALS
TEMPERATURE: 97.7 F | RESPIRATION RATE: 16 BRPM | WEIGHT: 286.8 LBS | DIASTOLIC BLOOD PRESSURE: 78 MMHG | HEART RATE: 90 BPM | HEIGHT: 71 IN | SYSTOLIC BLOOD PRESSURE: 124 MMHG | BODY MASS INDEX: 40.15 KG/M2 | OXYGEN SATURATION: 98 %

## 2024-12-18 DIAGNOSIS — Z00.00 ANNUAL PHYSICAL EXAM: ICD-10-CM

## 2024-12-18 DIAGNOSIS — E55.9 VITAMIN D DEFICIENCY: ICD-10-CM

## 2024-12-18 DIAGNOSIS — Z11.59 NEED FOR HEPATITIS C SCREENING TEST: ICD-10-CM

## 2024-12-18 DIAGNOSIS — E66.813 CLASS 3 SEVERE OBESITY WITHOUT SERIOUS COMORBIDITY WITH BODY MASS INDEX (BMI) OF 40.0 TO 44.9 IN ADULT, UNSPECIFIED OBESITY TYPE: ICD-10-CM

## 2024-12-18 DIAGNOSIS — F32.5 DEPRESSION, MAJOR, IN REMISSION (CMS-HCC): ICD-10-CM

## 2024-12-18 DIAGNOSIS — Z23 IMMUNIZATION DUE: ICD-10-CM

## 2024-12-18 DIAGNOSIS — F41.9 ANXIETY: ICD-10-CM

## 2024-12-18 DIAGNOSIS — G47.33 OSA ON CPAP: Primary | ICD-10-CM

## 2024-12-18 DIAGNOSIS — R74.01 ELEVATED ALT MEASUREMENT: ICD-10-CM

## 2024-12-18 DIAGNOSIS — E66.01 CLASS 3 SEVERE OBESITY WITHOUT SERIOUS COMORBIDITY WITH BODY MASS INDEX (BMI) OF 40.0 TO 44.9 IN ADULT, UNSPECIFIED OBESITY TYPE: ICD-10-CM

## 2024-12-18 PROCEDURE — 1036F TOBACCO NON-USER: CPT | Performed by: FAMILY MEDICINE

## 2024-12-18 PROCEDURE — 90471 IMMUNIZATION ADMIN: CPT | Performed by: FAMILY MEDICINE

## 2024-12-18 PROCEDURE — 99395 PREV VISIT EST AGE 18-39: CPT | Performed by: FAMILY MEDICINE

## 2024-12-18 PROCEDURE — 3008F BODY MASS INDEX DOCD: CPT | Performed by: FAMILY MEDICINE

## 2024-12-18 PROCEDURE — 90656 IIV3 VACC NO PRSV 0.5 ML IM: CPT | Performed by: FAMILY MEDICINE

## 2024-12-18 RX ORDER — SERTRALINE HYDROCHLORIDE 50 MG/1
75 TABLET, FILM COATED ORAL DAILY
Qty: 135 TABLET | Refills: 3 | Status: SHIPPED | OUTPATIENT
Start: 2024-12-18 | End: 2025-12-18

## 2024-12-18 RX ORDER — SERTRALINE HYDROCHLORIDE 50 MG/1
75 TABLET, FILM COATED ORAL DAILY
Qty: 135 TABLET | Refills: 3 | Status: SHIPPED | OUTPATIENT
Start: 2024-12-18 | End: 2024-12-18 | Stop reason: SDUPTHER

## 2024-12-18 ASSESSMENT — ANXIETY QUESTIONNAIRES
4. TROUBLE RELAXING: NOT AT ALL
6. BECOMING EASILY ANNOYED OR IRRITABLE: SEVERAL DAYS
1. FEELING NERVOUS, ANXIOUS, OR ON EDGE: SEVERAL DAYS
GAD7 TOTAL SCORE: 4
2. NOT BEING ABLE TO STOP OR CONTROL WORRYING: NOT AT ALL
7. FEELING AFRAID AS IF SOMETHING AWFUL MIGHT HAPPEN: NOT AT ALL
5. BEING SO RESTLESS THAT IT IS HARD TO SIT STILL: SEVERAL DAYS
3. WORRYING TOO MUCH ABOUT DIFFERENT THINGS: SEVERAL DAYS
IF YOU CHECKED OFF ANY PROBLEMS ON THIS QUESTIONNAIRE, HOW DIFFICULT HAVE THESE PROBLEMS MADE IT FOR YOU TO DO YOUR WORK, TAKE CARE OF THINGS AT HOME, OR GET ALONG WITH OTHER PEOPLE: NOT DIFFICULT AT ALL

## 2024-12-18 ASSESSMENT — PROMIS GLOBAL HEALTH SCALE
RATE_AVERAGE_FATIGUE: MILD
CARRYOUT_PHYSICAL_ACTIVITIES: COMPLETELY
RATE_PHYSICAL_HEALTH: VERY GOOD
RATE_QUALITY_OF_LIFE: VERY GOOD
CARRYOUT_SOCIAL_ACTIVITIES: VERY GOOD
RATE_MENTAL_HEALTH: VERY GOOD
EMOTIONAL_PROBLEMS: RARELY
RATE_AVERAGE_PAIN: 2
RATE_SOCIAL_SATISFACTION: VERY GOOD
RATE_GENERAL_HEALTH: VERY GOOD

## 2024-12-18 ASSESSMENT — PATIENT HEALTH QUESTIONNAIRE - PHQ9
2. FEELING DOWN, DEPRESSED OR HOPELESS: SEVERAL DAYS
7. TROUBLE CONCENTRATING ON THINGS, SUCH AS READING THE NEWSPAPER OR WATCHING TELEVISION: SEVERAL DAYS
3. TROUBLE FALLING OR STAYING ASLEEP OR SLEEPING TOO MUCH: NOT AT ALL
4. FEELING TIRED OR HAVING LITTLE ENERGY: SEVERAL DAYS
1. LITTLE INTEREST OR PLEASURE IN DOING THINGS: NOT AT ALL
SUM OF ALL RESPONSES TO PHQ9 QUESTIONS 1 AND 2: 1
10. IF YOU CHECKED OFF ANY PROBLEMS, HOW DIFFICULT HAVE THESE PROBLEMS MADE IT FOR YOU TO DO YOUR WORK, TAKE CARE OF THINGS AT HOME, OR GET ALONG WITH OTHER PEOPLE: NOT DIFFICULT AT ALL
6. FEELING BAD ABOUT YOURSELF - OR THAT YOU ARE A FAILURE OR HAVE LET YOURSELF OR YOUR FAMILY DOWN: NOT AT ALL
9. THOUGHTS THAT YOU WOULD BE BETTER OFF DEAD, OR OF HURTING YOURSELF: NOT AT ALL
8. MOVING OR SPEAKING SO SLOWLY THAT OTHER PEOPLE COULD HAVE NOTICED. OR THE OPPOSITE, BEING SO FIGETY OR RESTLESS THAT YOU HAVE BEEN MOVING AROUND A LOT MORE THAN USUAL: NOT AT ALL
SUM OF ALL RESPONSES TO PHQ QUESTIONS 1-9: 4
5. POOR APPETITE OR OVEREATING: SEVERAL DAYS

## 2024-12-18 ASSESSMENT — ENCOUNTER SYMPTOMS
ABDOMINAL PAIN: 0
PALPITATIONS: 0
PSYCHIATRIC NEGATIVE: 1
VOMITING: 0
HEADACHES: 0
CONSTIPATION: 0
WEAKNESS: 0
MUSCULOSKELETAL NEGATIVE: 1
SHORTNESS OF BREATH: 0
NUMBNESS: 0
COUGH: 0
FATIGUE: 0
FEVER: 0
NAUSEA: 0
DIZZINESS: 0
CHILLS: 0
SORE THROAT: 0
DIARRHEA: 0

## 2025-12-18 ENCOUNTER — APPOINTMENT (OUTPATIENT)
Dept: PRIMARY CARE | Facility: CLINIC | Age: 35
End: 2025-12-18
Payer: COMMERCIAL